# Patient Record
Sex: MALE | Race: WHITE | NOT HISPANIC OR LATINO
[De-identification: names, ages, dates, MRNs, and addresses within clinical notes are randomized per-mention and may not be internally consistent; named-entity substitution may affect disease eponyms.]

---

## 2018-08-02 ENCOUNTER — RESULT REVIEW (OUTPATIENT)
Age: 63
End: 2018-08-02

## 2021-04-13 ENCOUNTER — APPOINTMENT (OUTPATIENT)
Dept: HEART AND VASCULAR | Facility: CLINIC | Age: 66
End: 2021-04-13
Payer: COMMERCIAL

## 2021-04-13 VITALS
SYSTOLIC BLOOD PRESSURE: 141 MMHG | HEIGHT: 76 IN | TEMPERATURE: 98.4 F | DIASTOLIC BLOOD PRESSURE: 87 MMHG | HEART RATE: 97 BPM | WEIGHT: 244 LBS | BODY MASS INDEX: 29.71 KG/M2 | OXYGEN SATURATION: 97 %

## 2021-04-13 DIAGNOSIS — I71.2 THORACIC AORTIC ANEURYSM, W/OUT RUPTURE: ICD-10-CM

## 2021-04-13 DIAGNOSIS — Z86.79 PERSONAL HISTORY OF OTHER DISEASES OF THE CIRCULATORY SYSTEM: ICD-10-CM

## 2021-04-13 DIAGNOSIS — Z86.39 PERSONAL HISTORY OF OTHER ENDOCRINE, NUTRITIONAL AND METABOLIC DISEASE: ICD-10-CM

## 2021-04-13 DIAGNOSIS — Z78.9 OTHER SPECIFIED HEALTH STATUS: ICD-10-CM

## 2021-04-13 PROBLEM — Z00.00 ENCOUNTER FOR PREVENTIVE HEALTH EXAMINATION: Status: ACTIVE | Noted: 2021-04-13

## 2021-04-13 PROCEDURE — 99072 ADDL SUPL MATRL&STAF TM PHE: CPT

## 2021-04-13 PROCEDURE — 99205 OFFICE O/P NEW HI 60 MIN: CPT

## 2021-04-18 PROBLEM — Z86.79 HISTORY OF SUBDURAL HEMATOMA: Status: RESOLVED | Noted: 2021-04-18 | Resolved: 2021-04-18

## 2021-04-18 PROBLEM — Z78.9 FAMILY HISTORY UNKNOWN: Status: ACTIVE | Noted: 2021-04-18

## 2021-04-18 PROBLEM — Z78.9 CAFFEINE USE: Status: ACTIVE | Noted: 2021-04-13

## 2021-04-18 PROBLEM — Z86.39 HISTORY OF HYPERLIPIDEMIA: Status: RESOLVED | Noted: 2021-04-18 | Resolved: 2021-04-18

## 2021-04-18 PROBLEM — Z78.9 NON-SMOKER: Status: ACTIVE | Noted: 2021-04-13

## 2021-04-18 PROBLEM — Z86.79 HISTORY OF HYPERTENSION: Status: RESOLVED | Noted: 2021-04-18 | Resolved: 2021-04-18

## 2021-04-18 PROBLEM — I71.2 THORACIC AORTIC ANEURYSM: Status: RESOLVED | Noted: 2021-04-18 | Resolved: 2021-04-18

## 2021-04-18 RX ORDER — METOPROLOL SUCCINATE 50 MG/1
50 TABLET, EXTENDED RELEASE ORAL
Refills: 0 | Status: ACTIVE | COMMUNITY

## 2021-04-18 RX ORDER — LOSARTAN POTASSIUM 25 MG/1
25 TABLET, FILM COATED ORAL
Refills: 0 | Status: ACTIVE | COMMUNITY

## 2021-04-18 RX ORDER — DILTIAZEM HYDROCHLORIDE 180 MG/1
180 CAPSULE, COATED, EXTENDED RELEASE ORAL
Refills: 0 | Status: ACTIVE | COMMUNITY

## 2021-04-18 NOTE — HISTORY OF PRESENT ILLNESS
[FreeTextEntry1] : 66-year-old male with hypertension, hyperlipidemia, thoracic aortic aneurysm, and persistent atrial fibrillation.  He also has a history of subdural hematoma, but is tolerating oral anticoagulation.\par In March 2020, he woke up with some dizziness.  He had evidence of atrial flutter and Holter monitor showed atrial fibrillation.  He was started on Eliquis, and diltiazem was added.  He underwent cardioversion and felt good for about 4 months, but had recurrent AF in August.  Repeat cardioversion was performed, and he again felt good until just recently.  He had recurrent AF noted in March.  He again underwent cardioversion last week, but this was not successful and he remains in atrial fibrillation.\par He denies chest pain.  He notes palpitations and decreased exercise tolerance with the arrhythmia.  He still remains active.  He denies dizziness or syncope.  He checks his heart rate frequently and is concerned when it goes high.\par Nuclear stress test in December 2016 showed no ischemia.\par Echo in January 2019 showed normal LV function and no significant valve disease, and dilation of the ascending aorta (4.2 cm)\par EKG 3/29/2021 showed atrial fibrillation 85 bpm.

## 2021-04-18 NOTE — REVIEW OF SYSTEMS
[Fever] : no fever [Chills] : no chills [Feeling Fatigued] : feeling fatigued [Shortness Of Breath] : no shortness of breath [Dyspnea on exertion] : not dyspnea during exertion [Chest Pain] : no chest pain [Lower Ext Edema] : no extremity edema [Palpitations] : palpitations [Abdominal Pain] : no abdominal pain

## 2021-04-18 NOTE — PHYSICAL EXAM
[General Appearance - Well Nourished] : well nourished [General Appearance - In No Acute Distress] : no acute distress [Heart Sounds] : normal S1 and S2 [Murmurs] : no murmurs present [Edema] : no peripheral edema present [FreeTextEntry1] : irregular [Respiration, Rhythm And Depth] : normal respiratory rhythm and effort [Auscultation Breath Sounds / Voice Sounds] : lungs were clear to auscultation bilaterally [Abdomen Soft] : soft [Abdomen Tenderness] : non-tender [Abnormal Walk] : normal gait [Nail Clubbing] : no clubbing of the fingernails [Cyanosis, Localized] : no localized cyanosis [Impaired Insight] : insight and judgment were intact [Affect] : the affect was normal

## 2021-04-26 ENCOUNTER — RESULT REVIEW (OUTPATIENT)
Age: 66
End: 2021-04-26

## 2021-06-09 ENCOUNTER — INPATIENT (INPATIENT)
Facility: HOSPITAL | Age: 66
LOS: 0 days | Discharge: HOME | DRG: 274 | End: 2021-06-10
Attending: INTERNAL MEDICINE | Admitting: INTERNAL MEDICINE
Payer: COMMERCIAL

## 2021-06-09 VITALS
WEIGHT: 244.93 LBS | OXYGEN SATURATION: 95 % | HEIGHT: 77 IN | RESPIRATION RATE: 18 BRPM | SYSTOLIC BLOOD PRESSURE: 98 MMHG | HEART RATE: 76 BPM | TEMPERATURE: 99 F | DIASTOLIC BLOOD PRESSURE: 58 MMHG

## 2021-06-09 DIAGNOSIS — Z98.890 OTHER SPECIFIED POSTPROCEDURAL STATES: Chronic | ICD-10-CM

## 2021-06-09 LAB
ALBUMIN SERPL ELPH-MCNC: 4.6 G/DL — SIGNIFICANT CHANGE UP (ref 3.3–5)
ALP SERPL-CCNC: 87 U/L — SIGNIFICANT CHANGE UP (ref 40–120)
ALT FLD-CCNC: 16 U/L — SIGNIFICANT CHANGE UP (ref 10–45)
ANION GAP SERPL CALC-SCNC: 11 MMOL/L — SIGNIFICANT CHANGE UP (ref 5–17)
APTT BLD: 37.5 SEC — HIGH (ref 27.5–35.5)
AST SERPL-CCNC: 17 U/L — SIGNIFICANT CHANGE UP (ref 10–40)
BILIRUB SERPL-MCNC: 0.9 MG/DL — SIGNIFICANT CHANGE UP (ref 0.2–1.2)
BLD GP AB SCN SERPL QL: NEGATIVE — SIGNIFICANT CHANGE UP
BUN SERPL-MCNC: 20 MG/DL — SIGNIFICANT CHANGE UP (ref 7–23)
CALCIUM SERPL-MCNC: 9.6 MG/DL — SIGNIFICANT CHANGE UP (ref 8.4–10.5)
CHLORIDE SERPL-SCNC: 106 MMOL/L — SIGNIFICANT CHANGE UP (ref 96–108)
CO2 SERPL-SCNC: 27 MMOL/L — SIGNIFICANT CHANGE UP (ref 22–31)
CREAT SERPL-MCNC: 1.22 MG/DL — SIGNIFICANT CHANGE UP (ref 0.5–1.3)
GLUCOSE SERPL-MCNC: 110 MG/DL — HIGH (ref 70–99)
HCT VFR BLD CALC: 45.8 % — SIGNIFICANT CHANGE UP (ref 39–50)
HGB BLD-MCNC: 14.8 G/DL — SIGNIFICANT CHANGE UP (ref 13–17)
INR BLD: 1.21 — HIGH (ref 0.88–1.16)
MCHC RBC-ENTMCNC: 29.4 PG — SIGNIFICANT CHANGE UP (ref 27–34)
MCHC RBC-ENTMCNC: 32.3 GM/DL — SIGNIFICANT CHANGE UP (ref 32–36)
MCV RBC AUTO: 91.1 FL — SIGNIFICANT CHANGE UP (ref 80–100)
NRBC # BLD: 0 /100 WBCS — SIGNIFICANT CHANGE UP (ref 0–0)
PLATELET # BLD AUTO: 176 K/UL — SIGNIFICANT CHANGE UP (ref 150–400)
POTASSIUM SERPL-MCNC: 3.9 MMOL/L — SIGNIFICANT CHANGE UP (ref 3.5–5.3)
POTASSIUM SERPL-SCNC: 3.9 MMOL/L — SIGNIFICANT CHANGE UP (ref 3.5–5.3)
PROT SERPL-MCNC: 7.5 G/DL — SIGNIFICANT CHANGE UP (ref 6–8.3)
PROTHROM AB SERPL-ACNC: 14.4 SEC — HIGH (ref 10.6–13.6)
RBC # BLD: 5.03 M/UL — SIGNIFICANT CHANGE UP (ref 4.2–5.8)
RBC # FLD: 12.9 % — SIGNIFICANT CHANGE UP (ref 10.3–14.5)
RH IG SCN BLD-IMP: POSITIVE — SIGNIFICANT CHANGE UP
SODIUM SERPL-SCNC: 144 MMOL/L — SIGNIFICANT CHANGE UP (ref 135–145)
WBC # BLD: 7.42 K/UL — SIGNIFICANT CHANGE UP (ref 3.8–10.5)
WBC # FLD AUTO: 7.42 K/UL — SIGNIFICANT CHANGE UP (ref 3.8–10.5)

## 2021-06-09 PROCEDURE — 93613 INTRACARDIAC EPHYS 3D MAPG: CPT | Mod: GC

## 2021-06-09 PROCEDURE — 93662 INTRACARDIAC ECG (ICE): CPT | Mod: 26,GC

## 2021-06-09 PROCEDURE — 93656 COMPRE EP EVAL ABLTJ ATR FIB: CPT | Mod: GC

## 2021-06-09 PROCEDURE — 93655 ICAR CATH ABLTJ DSCRT ARRHYT: CPT | Mod: GC

## 2021-06-09 PROCEDURE — 93657 TX L/R ATRIAL FIB ADDL: CPT | Mod: GC

## 2021-06-09 RX ORDER — DILTIAZEM HCL 120 MG
180 CAPSULE, EXT RELEASE 24 HR ORAL DAILY
Refills: 0 | Status: DISCONTINUED | OUTPATIENT
Start: 2021-06-10 | End: 2021-06-10

## 2021-06-09 RX ORDER — METOPROLOL TARTRATE 50 MG
1 TABLET ORAL
Qty: 0 | Refills: 0 | DISCHARGE

## 2021-06-09 RX ORDER — LOSARTAN POTASSIUM 100 MG/1
25 TABLET, FILM COATED ORAL DAILY
Refills: 0 | Status: DISCONTINUED | OUTPATIENT
Start: 2021-06-09 | End: 2021-06-10

## 2021-06-09 RX ORDER — METOPROLOL TARTRATE 50 MG
50 TABLET ORAL DAILY
Refills: 0 | Status: DISCONTINUED | OUTPATIENT
Start: 2021-06-09 | End: 2021-06-10

## 2021-06-09 RX ORDER — LOSARTAN POTASSIUM 100 MG/1
1 TABLET, FILM COATED ORAL
Qty: 0 | Refills: 0 | DISCHARGE

## 2021-06-09 RX ORDER — APIXABAN 2.5 MG/1
1 TABLET, FILM COATED ORAL
Qty: 0 | Refills: 0 | DISCHARGE

## 2021-06-09 RX ORDER — HYDROCHLOROTHIAZIDE 25 MG
25 TABLET ORAL DAILY
Refills: 0 | Status: DISCONTINUED | OUTPATIENT
Start: 2021-06-09 | End: 2021-06-10

## 2021-06-09 RX ORDER — APIXABAN 2.5 MG/1
5 TABLET, FILM COATED ORAL EVERY 12 HOURS
Refills: 0 | Status: DISCONTINUED | OUTPATIENT
Start: 2021-06-09 | End: 2021-06-10

## 2021-06-09 RX ORDER — ATORVASTATIN CALCIUM 80 MG/1
20 TABLET, FILM COATED ORAL AT BEDTIME
Refills: 0 | Status: DISCONTINUED | OUTPATIENT
Start: 2021-06-09 | End: 2021-06-10

## 2021-06-09 RX ORDER — DILTIAZEM HCL 120 MG
1 CAPSULE, EXT RELEASE 24 HR ORAL
Qty: 0 | Refills: 0 | DISCHARGE

## 2021-06-09 RX ORDER — ATORVASTATIN CALCIUM 80 MG/1
1 TABLET, FILM COATED ORAL
Qty: 0 | Refills: 0 | DISCHARGE

## 2021-06-09 RX ADMIN — APIXABAN 5 MILLIGRAM(S): 2.5 TABLET, FILM COATED ORAL at 21:57

## 2021-06-09 RX ADMIN — ATORVASTATIN CALCIUM 20 MILLIGRAM(S): 80 TABLET, FILM COATED ORAL at 21:57

## 2021-06-09 NOTE — H&P ADULT - NSHPLABSRESULTS_GEN_ALL_CORE
Nuclear stress test in December 2016 showed no ischemia.     Echo in January 2019 showed normal LV function and no significant valve disease, and dilation of the ascending aorta (4.2 cm)     EKG 3/29/2021 showed atrial fibrillation 85 bpm.  EKG 6/9/2021  afib 64 bpm

## 2021-06-09 NOTE — CHART NOTE - NSCHARTNOTEFT_GEN_A_CORE
Pre-op Diagnosis:  Persistent atrial fibrillation    Post-op Diagnosis:  Same    Procedure:  Catheter ablation of atrial fibrillation    Electrophysiologist:  FRANCE Renteria MD    Assistant:  JAMAL Moreno MD    Anesthesia:  GA by the department of anesthesiology  See anesthesia notes    Access:  RFV x 1  LFV x 2    Description:  Successful catheter ablation atrial fibrillation    Complications:  None    EBL:  10 cc    Disposition:  Stable    Plan:  - Bedrest x 6 hrs   - Resume all home medications including anticoagulation  - Start pantoprazole 40 mg PO daily x 4 weeks  - Start Colchicine 0.6 mg daily x 2 weeks  - Anticipate discharge tomorrow Am

## 2021-06-09 NOTE — H&P ADULT - NSICDXPASTMEDICALHX_GEN_ALL_CORE_FT
PAST MEDICAL HISTORY:  Atrial fibrillation and flutter     Hyperlipidemia     Hypertension     Thoracic aortic aneurysm

## 2021-06-09 NOTE — H&P ADULT - ASSESSMENT
Mr. Norris is a 65 yo male with hypertension, hyperlipidemia, thoracic aortic aneurysm, and persistent atrial fibrillation who presents today for Afib ablation by Dr. Sierra.    - b/l groin checks for increased swelling, bleeding and hematoma  - sde eliquis tonight

## 2021-06-09 NOTE — H&P ADULT - HISTORY OF PRESENT ILLNESS
Mr. Norris is a 65 yo male with hypertension, hyperlipidemia, thoracic aortic aneurysm, and persistent atrial fibrillation who presents today for Afib ablation by Dr. Sierra.    of note, he also has a history of subdural hematoma, but is tolerating oral anticoagulation.     Briefly, in March 2020, he woke up with some dizziness. He had evidence of atrial flutter and Holter monitor showed atrial fibrillation. He was started on Eliquis, and diltiazem was added. He underwent cardioversion and felt good for about 4 months, but had recurrent AF in August. Repeat cardioversion was performed, and he again felt good until just recently. He had recurrent AF noted in March. He again underwent cardioversion in April, but this was not successful and he remained in atrial fibrillation.     He denies chest pain. He notes palpitations and decreased exercise tolerance with the arrhythmia. He still remains active. He denies dizziness or syncope. He checks his heart rate frequently and is concerned when it goes high.

## 2021-06-10 ENCOUNTER — TRANSCRIPTION ENCOUNTER (OUTPATIENT)
Age: 66
End: 2021-06-10

## 2021-06-10 VITALS — TEMPERATURE: 98 F

## 2021-06-10 LAB
COVID-19 SPIKE DOMAIN AB INTERP: POSITIVE
COVID-19 SPIKE DOMAIN ANTIBODY RESULT: >250 U/ML — HIGH
HCV AB S/CO SERPL IA: 0.03 S/CO — SIGNIFICANT CHANGE UP
HCV AB SERPL-IMP: SIGNIFICANT CHANGE UP
SARS-COV-2 IGG+IGM SERPL QL IA: >250 U/ML — HIGH
SARS-COV-2 IGG+IGM SERPL QL IA: POSITIVE

## 2021-06-10 PROCEDURE — C1893: CPT

## 2021-06-10 PROCEDURE — 86900 BLOOD TYPING SEROLOGIC ABO: CPT

## 2021-06-10 PROCEDURE — C1759: CPT

## 2021-06-10 PROCEDURE — 80053 COMPREHEN METABOLIC PANEL: CPT

## 2021-06-10 PROCEDURE — C1732: CPT

## 2021-06-10 PROCEDURE — 86769 SARS-COV-2 COVID-19 ANTIBODY: CPT

## 2021-06-10 PROCEDURE — 85027 COMPLETE CBC AUTOMATED: CPT

## 2021-06-10 PROCEDURE — 85610 PROTHROMBIN TIME: CPT

## 2021-06-10 PROCEDURE — 86803 HEPATITIS C AB TEST: CPT

## 2021-06-10 PROCEDURE — 85730 THROMBOPLASTIN TIME PARTIAL: CPT

## 2021-06-10 PROCEDURE — 36415 COLL VENOUS BLD VENIPUNCTURE: CPT

## 2021-06-10 PROCEDURE — 86850 RBC ANTIBODY SCREEN: CPT

## 2021-06-10 PROCEDURE — 86901 BLOOD TYPING SEROLOGIC RH(D): CPT

## 2021-06-10 PROCEDURE — C1766: CPT

## 2021-06-10 PROCEDURE — C1894: CPT

## 2021-06-10 PROCEDURE — C1730: CPT

## 2021-06-10 PROCEDURE — C1760: CPT

## 2021-06-10 RX ORDER — PANTOPRAZOLE SODIUM 20 MG/1
1 TABLET, DELAYED RELEASE ORAL
Qty: 0 | Refills: 0 | DISCHARGE
Start: 2021-06-10

## 2021-06-10 RX ORDER — COLCHICINE 0.6 MG
1 TABLET ORAL
Qty: 0 | Refills: 0 | DISCHARGE
Start: 2021-06-10

## 2021-06-10 RX ORDER — COLCHICINE 0.6 MG
1 TABLET ORAL
Qty: 7 | Refills: 0
Start: 2021-06-10 | End: 2021-06-16

## 2021-06-10 RX ORDER — PANTOPRAZOLE SODIUM 20 MG/1
1 TABLET, DELAYED RELEASE ORAL
Qty: 30 | Refills: 0
Start: 2021-06-10 | End: 2021-07-09

## 2021-06-10 RX ORDER — COLCHICINE 0.6 MG
0.6 TABLET ORAL DAILY
Refills: 0 | Status: DISCONTINUED | OUTPATIENT
Start: 2021-06-10 | End: 2021-06-10

## 2021-06-10 RX ORDER — PANTOPRAZOLE SODIUM 20 MG/1
40 TABLET, DELAYED RELEASE ORAL
Refills: 0 | Status: DISCONTINUED | OUTPATIENT
Start: 2021-06-10 | End: 2021-06-10

## 2021-06-10 RX ADMIN — PANTOPRAZOLE SODIUM 40 MILLIGRAM(S): 20 TABLET, DELAYED RELEASE ORAL at 11:10

## 2021-06-10 RX ADMIN — Medication 0.6 MILLIGRAM(S): at 11:10

## 2021-06-10 RX ADMIN — APIXABAN 5 MILLIGRAM(S): 2.5 TABLET, FILM COATED ORAL at 11:10

## 2021-06-10 RX ADMIN — LOSARTAN POTASSIUM 25 MILLIGRAM(S): 100 TABLET, FILM COATED ORAL at 05:45

## 2021-06-10 RX ADMIN — Medication 180 MILLIGRAM(S): at 07:40

## 2021-06-10 RX ADMIN — Medication 25 MILLIGRAM(S): at 11:10

## 2021-06-10 NOTE — DISCHARGE NOTE PROVIDER - NSDCFUADDINST_GEN_ALL_CORE_FT
You had an ablation on 6/9/21 by Dr. Sierra.    It is very important that you avoid strenuous activities and heavy lifting >10lbs for the next 7 days. Avoid sex, straining on toilet, and activities with repetitive bending at the hip. This is to avoid the complications of re-bleeding and hematoma development at the groin puncture sites.    Please monitor your groin sites for any increased swelling, pain or active bleeding. Call Dr. Sierra's office with any questions or concerns at    [phone].    You may shower normally today. Avoid scrubbing at the puncture sites. To reduce infection risk, please avoid submerging the groin sites in water, such as bath, swimming pool or jacuzzi. Leave puncture sites open to air. They do not require any coverage with a bandage. Avoid powders and ointments to the area.    You should continue to take all of your home medications as prescribed. You have been prescribed 2 new medications that have been sent to your pharmacy: Pantoprazole and colchicine. Pantoprazole is a gastric acid reducer to mitigate the heart burn sensation. The colchicine is an anti-inflammatory medication to assist with the chest discomfort felt post-procedure.    Please call Dr. Sierra's office to schedule a 4-week follow-up appointment.

## 2021-06-10 NOTE — DISCHARGE NOTE NURSING/CASE MANAGEMENT/SOCIAL WORK - PATIENT PORTAL LINK FT
You can access the FollowMyHealth Patient Portal offered by [facility] by registering at the following website: [email or web address]. By joining [facility]’s FollowMyHealth portal, you will also be able to view your health information using other applications (apps) compatible with our system.

## 2021-06-10 NOTE — DISCHARGE NOTE PROVIDER - NSDCMRMEDTOKEN_GEN_ALL_CORE_FT
atorvastatin 20 mg oral tablet: 1 tab(s) orally once a day (at bedtime)  colchicine 0.6 mg oral tablet: 1 tab(s) orally once a day  DilTIAZem (Eqv-Cardizem CD) 180 mg/24 hours oral capsule, extended release: 1 cap(s) orally once a day  Eliquis 5 mg oral tablet: 1 tab(s) orally every 12 hours  hydroCHLOROthiazide 25 mg oral tablet: 1 tab(s) orally once a day  losartan 25 mg oral tablet: 1 tab(s) orally once a day  Metoprolol Succinate ER 50 mg oral tablet, extended release: 1 tab(s) orally once a day  pantoprazole 40 mg oral delayed release tablet: 1 tab(s) orally once a day (before a meal)

## 2021-06-10 NOTE — DISCHARGE NOTE PROVIDER - HOSPITAL COURSE
Mr. Norris is a 65 yo male with hypertension, hyperlipidemia, thoracic aortic aneurysm and persistent atrial fibrillation who presented on 6/9/21 for an AFib ablation by Dr. Sierra. RFA procedure was successful and uncomplicated. On physical exam, bilateral groin sites are free of any active bleeding, increased swelling and hematoma. Review of his telemetry overnight show mis to be in normal sinus rhythm in the 70-80s bpm without events. He demonstrates understanding of his discharge instructions and new medications (protonix and colchicine). He will make a 4-week follow up appointment with Dr. Sierra at his [place] office. He is stable for discharge to home today.

## 2021-06-10 NOTE — DISCHARGE NOTE PROVIDER - NSDCCPTREATMENT_GEN_ALL_CORE_FT
PRINCIPAL PROCEDURE  Procedure: Cardiac electrophysiology study with radiofrequency ablation (RFA)  Findings and Treatment:

## 2021-06-11 PROBLEM — I10 ESSENTIAL (PRIMARY) HYPERTENSION: Chronic | Status: ACTIVE | Noted: 2021-06-09

## 2021-06-11 PROBLEM — I71.2 THORACIC AORTIC ANEURYSM, WITHOUT RUPTURE: Chronic | Status: ACTIVE | Noted: 2021-06-09

## 2021-06-11 PROBLEM — I48.91 UNSPECIFIED ATRIAL FIBRILLATION: Chronic | Status: ACTIVE | Noted: 2021-06-09

## 2021-06-11 PROBLEM — E78.5 HYPERLIPIDEMIA, UNSPECIFIED: Chronic | Status: ACTIVE | Noted: 2021-06-09

## 2021-06-17 DIAGNOSIS — Z88.2 ALLERGY STATUS TO SULFONAMIDES: ICD-10-CM

## 2021-06-17 DIAGNOSIS — Z88.0 ALLERGY STATUS TO PENICILLIN: ICD-10-CM

## 2021-06-17 DIAGNOSIS — I48.91 UNSPECIFIED ATRIAL FIBRILLATION: ICD-10-CM

## 2021-06-17 DIAGNOSIS — I10 ESSENTIAL (PRIMARY) HYPERTENSION: ICD-10-CM

## 2021-06-17 DIAGNOSIS — I48.19 OTHER PERSISTENT ATRIAL FIBRILLATION: ICD-10-CM

## 2021-06-17 DIAGNOSIS — Z79.01 LONG TERM (CURRENT) USE OF ANTICOAGULANTS: ICD-10-CM

## 2021-06-17 DIAGNOSIS — E78.5 HYPERLIPIDEMIA, UNSPECIFIED: ICD-10-CM

## 2021-06-17 DIAGNOSIS — Z86.79 PERSONAL HISTORY OF OTHER DISEASES OF THE CIRCULATORY SYSTEM: ICD-10-CM

## 2021-06-17 DIAGNOSIS — I71.2 THORACIC AORTIC ANEURYSM, WITHOUT RUPTURE: ICD-10-CM

## 2021-06-17 DIAGNOSIS — Z91.041 RADIOGRAPHIC DYE ALLERGY STATUS: ICD-10-CM

## 2021-07-13 ENCOUNTER — APPOINTMENT (OUTPATIENT)
Dept: HEART AND VASCULAR | Facility: CLINIC | Age: 66
End: 2021-07-13
Payer: COMMERCIAL

## 2021-07-13 VITALS
SYSTOLIC BLOOD PRESSURE: 130 MMHG | BODY MASS INDEX: 29.35 KG/M2 | HEIGHT: 76 IN | HEART RATE: 67 BPM | OXYGEN SATURATION: 98 % | DIASTOLIC BLOOD PRESSURE: 80 MMHG | WEIGHT: 241 LBS | TEMPERATURE: 98 F

## 2021-07-13 DIAGNOSIS — R00.2 PALPITATIONS: ICD-10-CM

## 2021-07-13 PROCEDURE — 99213 OFFICE O/P EST LOW 20 MIN: CPT

## 2021-07-13 RX ORDER — HYDROCHLOROTHIAZIDE 25 MG/1
25 TABLET ORAL
Qty: 30 | Refills: 0 | Status: ACTIVE | COMMUNITY
Start: 2021-06-02

## 2021-09-30 PROBLEM — R00.2 PALPITATIONS: Status: ACTIVE | Noted: 2021-04-18

## 2021-09-30 NOTE — PHYSICAL EXAM
[Well Developed] : well developed [Well Nourished] : well nourished [No Acute Distress] : no acute distress [Normal S1, S2] : normal S1, S2 [No Murmur] : no murmur [No Gallop] : no gallop [Clear Lung Fields] : clear lung fields [No Respiratory Distress] : no respiratory distress  [Soft] : abdomen soft [Non Tender] : non-tender [Normal Gait] : normal gait [No Edema] : no edema [No Rash] : no rash [Moves all extremities] : moves all extremities

## 2021-09-30 NOTE — HISTORY OF PRESENT ILLNESS
[FreeTextEntry1] : 66-year-old male with hypertension, hyperlipidemia, thoracic aortic aneurysm, and persistent atrial fibrillation.  He also has a history of subdural hematoma, but has tolerated oral anticoagulation.\par In March 2020, he woke up with some dizziness.  He had evidence of atrial flutter and Holter monitor showed atrial fibrillation.  He was started on Eliquis, and diltiazem was added.  He underwent cardioversion and felt good for about 4 months, but had recurrent AF in August.  Repeat cardioversion was performed, and he again felt good but had recurrent AF noted in March 2021.  He again underwent cardioversion which was not successful.\par He noted palpitations and decreased exercise tolerance with the arrhythmia.  Nuclear stress test in December 2016 showed no ischemia.  Echo in January 2019 showed normal LV function and no significant valve disease, and dilation of the ascending aorta (4.2 cm).\par He underwent AF ablation (PVI, CTI, posterior line) in June 2021.  He noted some swelling at one of the access sites, but has not otherwise had any issues.  He feels good and has no complaints.

## 2021-09-30 NOTE — REVIEW OF SYSTEMS
[Fever] : no fever [Chills] : no chills [SOB] : no shortness of breath [Dyspnea on exertion] : not dyspnea during exertion [Chest Discomfort] : no chest discomfort [Palpitations] : no palpitations [Syncope] : no syncope [Abdominal Pain] : no abdominal pain [Nausea] : no nausea [Vomiting] : no vomiting [Dizziness] : no dizziness

## 2021-10-21 ENCOUNTER — RESULT REVIEW (OUTPATIENT)
Age: 66
End: 2021-10-21

## 2022-01-11 ENCOUNTER — NON-APPOINTMENT (OUTPATIENT)
Age: 67
End: 2022-01-11

## 2022-01-11 ENCOUNTER — APPOINTMENT (OUTPATIENT)
Dept: HEART AND VASCULAR | Facility: CLINIC | Age: 67
End: 2022-01-11
Payer: MEDICARE

## 2022-01-11 VITALS
DIASTOLIC BLOOD PRESSURE: 70 MMHG | TEMPERATURE: 97.5 F | BODY MASS INDEX: 29.96 KG/M2 | WEIGHT: 246 LBS | OXYGEN SATURATION: 98 % | SYSTOLIC BLOOD PRESSURE: 115 MMHG | HEART RATE: 64 BPM | HEIGHT: 76 IN

## 2022-01-11 PROCEDURE — 99214 OFFICE O/P EST MOD 30 MIN: CPT | Mod: 25

## 2022-01-11 PROCEDURE — 93000 ELECTROCARDIOGRAM COMPLETE: CPT

## 2022-01-11 NOTE — HISTORY OF PRESENT ILLNESS
[FreeTextEntry1] : 66-year-old male with hypertension, hyperlipidemia, thoracic aortic aneurysm, and persistent atrial fibrillation.  He also has a history of subdural hematoma, but has tolerated oral anticoagulation.\par In March 2020, he woke up with dizziness and was diagnosed with atrial flutter.  Holter monitor showed atrial fibrillation.  He was started on Eliquis, and diltiazem was added.  He underwent cardioversion and felt good for about 4 months, but had recurrent AF in August 2020.  Repeat cardioversion was performed, and he again felt good but had recurrent AF noted in March 2021.  Another cardioversion was not successful.\par He noted palpitations and decreased exercise tolerance with the arrhythmia.  Nuclear stress test in December 2016 showed no ischemia.  Echo in January 2019 showed normal LV function and no significant valve disease, and dilation of the ascending aorta (4.2 cm).\par He underwent AF ablation (PVI, CTI, posterior line) in June 2021.  He returns today for follow-up.  He feels good and has no complaints.  He keeps track of his rhythm with an Apple watch, which has not alerted him to any abnormalities.\par EKG today showed sinus rhythm at 64bpm with a PVC.  He has a mild IVCD with a QRS duration of 108ms.  EKG is otherwise normal.

## 2022-06-07 ENCOUNTER — APPOINTMENT (OUTPATIENT)
Dept: HEART AND VASCULAR | Facility: CLINIC | Age: 67
End: 2022-06-07
Payer: MEDICARE

## 2022-06-07 ENCOUNTER — NON-APPOINTMENT (OUTPATIENT)
Age: 67
End: 2022-06-07

## 2022-06-07 VITALS
HEIGHT: 76 IN | OXYGEN SATURATION: 97 % | BODY MASS INDEX: 30.93 KG/M2 | HEART RATE: 72 BPM | WEIGHT: 254 LBS | SYSTOLIC BLOOD PRESSURE: 130 MMHG | DIASTOLIC BLOOD PRESSURE: 65 MMHG | TEMPERATURE: 97.8 F

## 2022-06-07 PROCEDURE — 99214 OFFICE O/P EST MOD 30 MIN: CPT | Mod: 25

## 2022-06-07 PROCEDURE — 93000 ELECTROCARDIOGRAM COMPLETE: CPT

## 2022-06-07 RX ORDER — ATORVASTATIN CALCIUM 20 MG/1
20 TABLET, FILM COATED ORAL
Refills: 0 | Status: DISCONTINUED | COMMUNITY
End: 2022-06-07

## 2022-06-07 RX ORDER — ATORVASTATIN CALCIUM 40 MG/1
40 TABLET, FILM COATED ORAL
Qty: 90 | Refills: 0 | Status: ACTIVE | COMMUNITY
Start: 2022-02-01

## 2022-06-07 NOTE — HISTORY OF PRESENT ILLNESS
[FreeTextEntry1] : 67-year-old male with hypertension, hyperlipidemia, thoracic aortic aneurysm, and persistent atrial fibrillation.  He also has a history of subdural hematoma, but has tolerated oral anticoagulation.\par In March 2020, he woke up with dizziness and was diagnosed with atrial flutter.  Holter monitor showed atrial fibrillation.  He was started on Eliquis, and diltiazem was added.  He underwent cardioversion and felt good for about 4 months, but had recurrent AF in August 2020.  Repeat cardioversion was performed, and he again felt good but had recurrent AF noted in March 2021.  Another cardioversion was not successful.\par He noted palpitations and decreased exercise tolerance with the arrhythmia.  Nuclear stress test in December 2016 showed no ischemia.  Echo in January 2019 showed normal LV function and no significant valve disease, and dilation of the ascending aorta (4.2 cm).\par He underwent AF ablation (PVI, CTI, posterior line) in June 2021.  He returns today for follow-up.  He feels good and has no complaints.  He keeps track of his rhythm with an Apple watch, which has not alerted him to any abnormalities.\par EKG today showed sinus rhythm at 64bpm with two PVCs.

## 2022-06-22 ENCOUNTER — APPOINTMENT (OUTPATIENT)
Dept: HEART AND VASCULAR | Facility: CLINIC | Age: 67
End: 2022-06-22

## 2022-06-22 PROCEDURE — 93244 EXT ECG>48HR<7D REV&INTERPJ: CPT

## 2022-08-23 ENCOUNTER — APPOINTMENT (OUTPATIENT)
Dept: HEART AND VASCULAR | Facility: CLINIC | Age: 67
End: 2022-08-23

## 2022-09-06 ENCOUNTER — APPOINTMENT (OUTPATIENT)
Dept: HEART AND VASCULAR | Facility: CLINIC | Age: 67
End: 2022-09-06

## 2022-09-06 ENCOUNTER — NON-APPOINTMENT (OUTPATIENT)
Age: 67
End: 2022-09-06

## 2022-09-06 VITALS
DIASTOLIC BLOOD PRESSURE: 87 MMHG | SYSTOLIC BLOOD PRESSURE: 132 MMHG | HEIGHT: 76 IN | WEIGHT: 257 LBS | BODY MASS INDEX: 31.29 KG/M2 | OXYGEN SATURATION: 97 % | HEART RATE: 68 BPM | TEMPERATURE: 98.2 F

## 2022-09-06 DIAGNOSIS — I49.3 VENTRICULAR PREMATURE DEPOLARIZATION: ICD-10-CM

## 2022-09-06 DIAGNOSIS — I48.19 OTHER PERSISTENT ATRIAL FIBRILLATION: ICD-10-CM

## 2022-09-06 PROCEDURE — 99214 OFFICE O/P EST MOD 30 MIN: CPT

## 2022-09-12 PROBLEM — I49.3 PVC'S (PREMATURE VENTRICULAR CONTRACTIONS): Status: ACTIVE | Noted: 2022-09-12

## 2022-09-12 PROBLEM — I48.19 OTHER PERSISTENT ATRIAL FIBRILLATION: Status: ACTIVE | Noted: 2021-04-18

## 2022-09-12 RX ORDER — APIXABAN 5 MG/1
5 TABLET, FILM COATED ORAL
Refills: 0 | Status: DISCONTINUED | COMMUNITY
End: 2022-09-12

## 2022-09-12 NOTE — HISTORY OF PRESENT ILLNESS
[FreeTextEntry1] : 67-year-old male with hypertension, hyperlipidemia, thoracic aortic aneurysm, and persistent atrial fibrillation.  He also has a history of subdural hematoma in the setting of trauma, but has tolerated oral anticoagulation.\par In March 2020, he woke up with dizziness and was diagnosed with atrial flutter.  Holter monitor showed atrial fibrillation.  He was started on Eliquis, and diltiazem was added.  He underwent cardioversion and felt good for about 4 months, but had recurrent AF in August 2020.  Repeat cardioversion was performed, and he again felt good but had recurrent AF noted in March 2021.  Another cardioversion was not successful.\par He noted palpitations and decreased exercise tolerance with the arrhythmia.  Nuclear stress test in December 2016 showed no ischemia.  Echo in January 2019 showed normal LV function and no significant valve disease, and dilation of the ascending aorta (4.2 cm).\par He underwent AF ablation (PVI, CTI, posterior line) in June 2021. \par He returns today for follow-up.  He continues to feel good and has no complaints.  He keeps track of his rhythm with an Apple watch, which has not alerted him to any abnormalities.\par EKG today showed sinus rhythm at 63 bpm.\par He wore a one-week Zio XT monitor which showed no evidence of AF, frequent PVCs (4% burden), and a few brief atrial runs (longest 7 beats).

## 2025-09-12 ENCOUNTER — APPOINTMENT (OUTPATIENT)
Dept: DERMATOLOGY | Facility: CLINIC | Age: 70
End: 2025-09-12
Payer: MEDICARE

## 2025-09-12 VITALS — WEIGHT: 255 LBS | BODY MASS INDEX: 31.05 KG/M2 | HEIGHT: 76 IN
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
## 2025-09-12 PROCEDURE — 17110 DESTRUCTION B9 LES UP TO 14: CPT

## 2025-09-12 PROCEDURE — 99204 OFFICE O/P NEW MOD 45 MIN: CPT | Mod: 25

## 2025-09-12 RX ORDER — METOPROLOL TARTRATE 50 MG/1
50 TABLET ORAL
Refills: 0 | Status: ACTIVE | COMMUNITY

## 2025-09-12 RX ORDER — CICLOPIROX OLAMINE 7.7 MG/G
0.77 CREAM TOPICAL
Refills: 0 | Status: ACTIVE | COMMUNITY

## 2025-09-12 RX ORDER — TRIAMCINOLONE ACETONIDE 1 MG/G
0.1 CREAM TOPICAL
Qty: 1 | Refills: 0 | Status: ACTIVE | COMMUNITY
Start: 2025-09-12 | End: 1900-01-01

## 2025-09-12 RX ORDER — APIXABAN 5 MG/1
5 TABLET, FILM COATED ORAL
Refills: 0 | Status: ACTIVE | COMMUNITY

## 2025-09-12 RX ORDER — UBIDECARENONE/VIT E ACET 100MG-5
CAPSULE ORAL
Refills: 0 | Status: ACTIVE | COMMUNITY

## 2025-09-12 RX ORDER — LOSARTAN POTASSIUM 100 MG/1
TABLET, FILM COATED ORAL
Refills: 0 | Status: ACTIVE | COMMUNITY

## 2025-09-12 RX ORDER — HYDROCHLOROTHIAZIDE 25 MG/1
25 TABLET ORAL
Refills: 0 | Status: ACTIVE | COMMUNITY

## 2025-09-12 RX ORDER — DILTIAZEM HYDROCHLORIDE 180 MG/1
180 CAPSULE, EXTENDED RELEASE ORAL
Refills: 0 | Status: ACTIVE | COMMUNITY

## 2025-09-12 RX ORDER — HYDROCORTISONE 25 MG/G
2.5 OINTMENT TOPICAL
Refills: 0 | Status: ACTIVE | COMMUNITY